# Patient Record
(demographics unavailable — no encounter records)

---

## 2025-03-14 NOTE — HISTORY OF PRESENT ILLNESS
[de-identified] : diffiuclty in school and learning states that has been having a very hard time [FreeTextEntry6] : falling behind in classwork  not able to keep up and mother states that has also been having difficulty in keeping up with chores at home

## 2025-04-18 NOTE — QUALITY MEASURES
[Impairment in more than one setting] : Impairment in more than one setting: Yes [Coexisting conditions] : Coexisting conditions: Yes [Medication choices] : Medication choices: Not Applicable [Side effects of medications] : Side effects of medications: Not Applicable [FreeTextEntry1] : [place] forms given for parent and teacher to complete

## 2025-04-18 NOTE — ASSESSMENT
[FreeTextEntry1] : Kate is a 10 y/o girl seen today for an initial visit for inattention and behavioral concerns. At home and at school she is unable to focus, easily distracted, and fidgety. [place] forms given for parent and teacher to complete. ADHD evaluation is ongoing.

## 2025-04-18 NOTE — CONSULT LETTER
[Dear  ___] : Dear  [unfilled], [Courtesy Letter:] : I had the pleasure of seeing your patient, [unfilled], in my office today. [Please see my note below.] : Please see my note below. [Consult Closing:] : Thank you very much for allowing me to participate in the care of this patient.  If you have any questions, please do not hesitate to contact me. [Sincerely,] : Sincerely, [FreeTextEntry3] : Sharon Mclaughlin NP-BC Certified Family Nurse Practitioner Pediatric Neurology [facility]

## 2025-04-18 NOTE — PLAN
[FreeTextEntry1] : - [place] questionnaires given to mother for parent and teacher -  Discussed use of Omega 3 fish oil - Discussed use of medications as well as side effects if accommodations do not improve school performance - Follow up 1 month to review [place] questionnaires

## 2025-04-18 NOTE — HISTORY OF PRESENT ILLNESS
[FreeTextEntry1] : Glenn is a 10 y/o girl seen today for an initial visit for inattention and behavioral concerns. At home, she is unable to sit through a meal without getting up. She is able to follow multi-step instructions without reminders. she able to do routine things without reminders. She can be forgetful, leaving personal items behind and forgetting what she was just told. During homework it takes her long to complete because she gets frustrated easily.    At school, teacher reports that she does better in a small group setting. She is unable to retain the information. She gets frustrated easily. She is unable to focus and because of that she is not retaining the information. She requires redirection often. grades are not good, and mom thinks she can do better. Pt. reports that she frequently runs out of time when tasking exams and gets distracted easily.     Early development: GLENN was born full term via NVD. she was discharged home with mother. she hit all early developmental milestones appropriately.  GLENN attended day care program starting at 3 years old and then pre-school at age 4.  Mother denies academic, behavioral or social concerns.   Educational assessment: Inattention and behavioral concerns  Current Grade: 4th grade  Current District: [address] General ED/Any Accommodations/ICT in place: In a regular class with 26 students with 1 teacher at a time    Hyperactivity: She is sometimes fidgety   Impulsivity:  Can be aggressive with mother and siblings if she is upset. Does things without thinking it through. Interrupts when others are speaking, has a hard time waiting   Social Concerns: Denies any social concerns, has friends at school  Sleep: sleeps well, goes to sleep 10pm and wakes up 7am sleeps through the night  Eating: eats a varied diet Play: Dance class, volleyball, basketball. She is able to focus during dance class    Family hx of developmental delays/ADD/ADHD: Denies  Other coexisting behaviors? -Mood disorder/depression: Denies  -Anxiety: Denies      Co- morbidities: No concern for anxiety, depression, OCD   Other health concerns: Denies staring, twitching, seizure or seizure-like activity. No serious head injury, meningoencephalitis.

## 2025-04-25 NOTE — DISCUSSION/SUMMARY
[School] : school [Development and Mental Health] : development and mental health [Nutrition and Physical Activity] : nutrition and physical activity [Oral Health] : oral health [Safety] : safety [FreeTextEntry1] : 9 year old for WCC visit. Well appearing child on exam.   -Age-appropriate anticipatory guidance provided -Labs as ordered -Worsening allergies: OTC symptomatic relief medications reviewed. Mother interested in considering allergy shots, Allergist referral provided  -Inattention: Follow up with Neuro in place  -RTC for next WCC visit/sooner if any concerns arise

## 2025-04-25 NOTE — HISTORY OF PRESENT ILLNESS
[Mother] : mother [whole] : whole milk [Fruit] : fruit [Vegetables] : vegetables [Meat] : meat [Grains] : grains [Eggs] : eggs [Fish] : fish [Dairy] : dairy [Vitamins] : takes vitamins  [Eats healthy meals and snacks] : eats healthy meals and snacks [Eats meals with family] : eats meals with family [Normal] : Normal [Brushing teeth twice/d] : brushing teeth twice per day [Yes] : Patient goes to dentist yearly [Toothpaste] : Primary Fluoride Source: Toothpaste [Grade ___] : Grade [unfilled] [No] : No cigarette smoke exposure [FreeTextEntry7] : 9 year old [facility] visit  [de-identified] : No Menarche yet  [de-identified] : Difficulty with assignments  [FreeTextEntry1] : Concerned for ADHD, evaluated by Neuro, pending follow up. Mother reports worsening seasonal allergy symptoms with every year.

## 2025-05-30 NOTE — CONSULT LETTER
[Dear  ___] : Dear  [unfilled], [Courtesy Letter:] : I had the pleasure of seeing your patient, [unfilled], in my office today. [Please see my note below.] : Please see my note below. [Consult Closing:] : Thank you very much for allowing me to participate in the care of this patient.  If you have any questions, please do not hesitate to contact me. [Sincerely,] : Sincerely, [FreeTextEntry3] : Sharon Mclaughlin NP-BC Certified Family Nurse Practitioner Pediatric Neurology [facility]

## 2025-05-30 NOTE — HISTORY OF PRESENT ILLNESS
[FreeTextEntry1] : INTERVAL HX 05/30/2025 Glenn is a 8 y/o girl seen today for a follow-up visit for inattention and behavioral concerns. Since last visit mom denies any changes to her behavior. [place] forms submitted by parent and teacher for ADHD evaluation.  _______________________________________________ PREVIOUS VISIT 04/18/2025 Glenn is a 8 y/o girl seen today for an initial visit for inattention and behavioral concerns. At home, she is unable to sit through a meal without getting up. She is able to follow multi-step instructions without reminders. she able to do routine things without reminders. She can be forgetful, leaving personal items behind and forgetting what she was just told. During homework it takes her long to complete because she gets frustrated easily.    At school, teacher reports that she does better in a small group setting. She is unable to retain the information. She gets frustrated easily. She is unable to focus and because of that she is not retaining the information. She requires redirection often. grades are not good, and mom thinks she can do better. Pt. reports that she frequently runs out of time when tasking exams and gets distracted easily.     Early development: GLENN was born full term via NVD. she was discharged home with mother. she hit all early developmental milestones appropriately.  GLENN attended day care program starting at 3 years old and then pre-school at age 4.  Mother denies academic, behavioral or social concerns.   Educational assessment: Inattention and behavioral concerns  Current Grade: 4th grade  Current District: [place] [address] [place] [address] ED/Any Accommodations/ICT in place: In a regular class with 26 students with 1 teacher at a time    Hyperactivity: She is sometimes fidgety   Impulsivity:  Can be aggressive with mother and siblings if she is upset. Does things without thinking it through. Interrupts when others are speaking, has a hard time waiting   Social Concerns: Denies any social concerns, has friends at school  Sleep: sleeps well, goes to sleep 10pm and wakes up 7am sleeps through the night  Eating: eats a varied diet Play: Dance class, volleyball, basketball. She is able to focus during dance class    Family hx of developmental delays/ADD/ADHD: Denies  Other coexisting behaviors? -Mood disorder/depression: Denies  -Anxiety: Denies      Co- morbidities: No concern for anxiety, depression, OCD   Other health concerns: Denies staring, twitching, seizure or seizure-like activity. No serious head injury, meningoencephalitis.

## 2025-05-30 NOTE — QUALITY MEASURES
[Impairment in more than one setting] : Impairment in more than one setting: Yes [Coexisting conditions] : Coexisting conditions: Yes [Medication choices] : Medication choices: Not Applicable [Side effects of medications] : Side effects of medications: Not Applicable [FreeTextEntry1] : [place] forms given for parent and teacher to complete

## 2025-05-30 NOTE — ASSESSMENT
[FreeTextEntry1] : Kate is a 8 y/o girl seen today for an initial visit for inattention and behavioral concerns. At home and at school she is unable to focus, easily distracted, and fidgety. [place] forms reviewed, pt. doesn't meet the criteria for ADHD. Mom will schedule psychoeducational eval.

## 2025-05-30 NOTE — PLAN
[FreeTextEntry1] : CURRENT PLAN 05/30/2025 - [place] forms reviewed- doesn't meet the criteria  - psychoeducational eval  -Follow-up as needed  _________________________________________ PREVIOUS PLAN 04/18/2025 - [place] questionnaires given to mother for parent and teacher -  Discussed use of Omega 3 fish oil - Discussed use of medications as well as side effects if accommodations do not improve school performance - Follow up 1 month to review [place] questionnaires

## 2025-05-30 NOTE — DATA REVIEWED
[No studies available for review at this time.] : No studies available for review at this time. [FreeTextEntry1] : Parents responses:  Inattention 3/9- (6/9).    Hyperactivity 4/9 (6/9)  ODD: 4/8. (4/8)  Conduct disorder: 1/14 (3/14)  Anxiety/ Depression: 4/7 (3/7)    Teachers responses:   Inattention 6/9- (6/9).    Hyperactivity 0/9 (6/9)  ODD/ Conduct: 0/10. (3/10)  Anxiety/ Depression: 0/7 (3/7)    Performance questions: Parents: Areas of concern include overall school performance, reading, writing, mathematics, and relationship with peers Teachers: Areas of concern include reading, mathematics and written expression. Following directions, assignment completion and organizational skills.

## 2025-05-30 NOTE — REASON FOR VISIT
[Follow-Up Evaluation] : a follow-up evaluation for [ADHD] : ADHD [Patient] : patient [Mother] : mother [Home] : at home, [unfilled] , at the time of the visit. [Medical Office: ([facility])___] : at the medical office located in  [Telehealth (audio & video)] : This visit was provided via telehealth using real-time 2-way audio visual technology. [FreeTextEntry3] : Mother